# Patient Record
Sex: FEMALE | Race: WHITE | Employment: UNEMPLOYED | URBAN - METROPOLITAN AREA
[De-identification: names, ages, dates, MRNs, and addresses within clinical notes are randomized per-mention and may not be internally consistent; named-entity substitution may affect disease eponyms.]

---

## 2020-05-28 ENCOUNTER — TELEPHONE (OUTPATIENT)
Dept: FAMILY MEDICINE CLINIC | Age: 40
End: 2020-05-28

## 2020-05-28 NOTE — TELEPHONE ENCOUNTER
The following patient is requesting a new patient appointment    With Provider: Scarlet Jenkins     Insurance: [employer]    Medications / Conditions / Complaint : she is asking if she get IUD, Paragaurd. Or if she could be referred to a Gyn that does it.     Preferred Days: Monday is best, other days are ok    Preferred Time: n/a    Best Contact Number: [phone]

## 2020-06-01 ENCOUNTER — VIRTUAL VISIT (OUTPATIENT)
Dept: FAMILY MEDICINE CLINIC | Age: 40
End: 2020-06-01
Payer: COMMERCIAL

## 2020-06-01 PROCEDURE — 99442 PR PHYS/QHP TELEPHONE EVALUATION 11-20 MIN: CPT | Performed by: NURSE PRACTITIONER

## 2020-06-01 RX ORDER — VENLAFAXINE HYDROCHLORIDE 75 MG/1
75 CAPSULE, EXTENDED RELEASE ORAL DAILY
Qty: 30 CAPSULE | Refills: 3 | Status: SHIPPED | OUTPATIENT
Start: 2020-06-01 | End: 2020-08-03 | Stop reason: DRUGHIGH

## 2020-06-01 RX ORDER — LEVOTHYROXINE SODIUM 112 UG/1
112 TABLET ORAL
COMMUNITY
Start: 2020-05-11

## 2020-06-01 ASSESSMENT — PATIENT HEALTH QUESTIONNAIRE - PHQ9
1. LITTLE INTEREST OR PLEASURE IN DOING THINGS: 0
SUM OF ALL RESPONSES TO PHQ QUESTIONS 1-9: 0
2. FEELING DOWN, DEPRESSED OR HOPELESS: 0
SUM OF ALL RESPONSES TO PHQ9 QUESTIONS 1 & 2: 0
SUM OF ALL RESPONSES TO PHQ QUESTIONS 1-9: 0

## 2020-06-01 NOTE — PROGRESS NOTES
colon cancer in mother  Continue close follow up with GI for colonoscopy. Documentation:  I communicated with the patient and/or health care decision maker about establish care. Details of this discussion including any medical advice provided:       I affirm this is a Patient Initiated Episode with a Patient who has not had a related appointment within my department in the past 7 days or scheduled within the next 24 hours.     Patient identification was verified at the start of the visit: Yes    Total Time: minutes: 11-20 minutes    Note: not billable if this call serves to triage the patient into an appointment for the relevant concern      Florance Ridgel

## 2020-08-03 ENCOUNTER — TELEPHONE (OUTPATIENT)
Dept: FAMILY MEDICINE CLINIC | Age: 40
End: 2020-08-03

## 2020-08-03 RX ORDER — VENLAFAXINE HYDROCHLORIDE 37.5 MG/1
37.5 CAPSULE, EXTENDED RELEASE ORAL DAILY
Qty: 30 CAPSULE | Refills: 1 | Status: SHIPPED | OUTPATIENT
Start: 2020-08-03 | End: 2020-08-28 | Stop reason: DRUGHIGH

## 2020-08-03 NOTE — TELEPHONE ENCOUNTER
I would recommend first trying to lower the dosage of the effexor to 37.5. she would need to wean off the effexor first anyway so would be the first step. I will send in a prescription for the 37.5 and have her follow up with me in 1 month. We can discuss then if she is feeling better or if we needs to continue the wean to try something else.

## 2020-08-03 NOTE — TELEPHONE ENCOUNTER
Pt wants to try something else in place of the effexor. She feels it is too potent. She said she has been tired and more \"spacey\". She wants to see what Usha advises.

## 2020-08-27 ENCOUNTER — TELEPHONE (OUTPATIENT)
Dept: FAMILY MEDICINE CLINIC | Age: 40
End: 2020-08-27

## 2020-08-27 RX ORDER — VENLAFAXINE HYDROCHLORIDE 37.5 MG/1
37.5 CAPSULE, EXTENDED RELEASE ORAL DAILY
Qty: 30 CAPSULE | Refills: 1 | Status: CANCELLED | OUTPATIENT
Start: 2020-08-27

## 2020-08-27 NOTE — TELEPHONE ENCOUNTER
Pt states she has enough to last til tomorrow until I can send this to  [facility].

## 2020-08-27 NOTE — TELEPHONE ENCOUNTER
Pt wants to ask Usha if she can go back to original dose of effexor? Pt said she decreased to 37.5 mg but has had to start taking 2 tabs to increase again with everything going on. She states 1/2 dose was not enough. Pt said she has about 1 - 2 days left of the med. She also wants to see if she can  refill of this at [facility]. Please advise. Pt is aware that Sandie Rachel is not in today.       Requested Prescriptions     Pending Prescriptions Disp Refills    venlafaxine (EFFEXOR XR) 37.5 MG extended release capsule 30 capsule 1     Sig: Take 1 capsule by mouth daily   Yana 5999 Pradeep  Last ov 06/01/20  vv

## 2020-08-28 RX ORDER — VENLAFAXINE HYDROCHLORIDE 75 MG/1
75 CAPSULE, EXTENDED RELEASE ORAL DAILY
Qty: 30 CAPSULE | Refills: 5 | Status: SHIPPED | OUTPATIENT
Start: 2020-08-28

## 2020-09-10 ENCOUNTER — TELEPHONE (OUTPATIENT)
Dept: FAMILY MEDICINE CLINIC | Age: 40
End: 2020-09-10

## 2020-09-10 NOTE — TELEPHONE ENCOUNTER
If it has been going on for months and she states her sinuses and lungs are clear then it is likely allergy related. Continue flonase and add zytrec daily. If no improvement then she needs a virtual visit or red clinic visit.

## 2020-09-10 NOTE — TELEPHONE ENCOUNTER
Patient called and states that she has a mucous type cough. Patient states that she has this for months. Patient states that Mucinex helps some but she can't seem to get anything out when she coughs. When she does, it is yellowish-green. Patient states that her sinuses and lungs are clear. Please send rx to [id number] [facility] in [place].

## 2021-01-01 ENCOUNTER — HOSPITAL ENCOUNTER (EMERGENCY)
Age: 41
Discharge: HOME | End: 2021-01-01
Payer: COMMERCIAL

## 2021-01-01 VITALS
HEART RATE: 97 BPM | RESPIRATION RATE: 16 BRPM | WEIGHT: 115 LBS | SYSTOLIC BLOOD PRESSURE: 127 MMHG | OXYGEN SATURATION: 96 % | HEIGHT: 63 IN | BODY MASS INDEX: 20.38 KG/M2 | DIASTOLIC BLOOD PRESSURE: 88 MMHG | TEMPERATURE: 97.1 F

## 2021-01-01 DIAGNOSIS — S01.01XA LACERATION OF SCALP, INITIAL ENCOUNTER: Primary | ICD-10-CM

## 2021-01-01 PROCEDURE — 6360000002 HC RX W HCPCS: Performed by: NURSE PRACTITIONER

## 2021-01-01 PROCEDURE — 99282 EMERGENCY DEPT VISIT SF MDM: CPT

## 2021-01-01 PROCEDURE — 90471 IMMUNIZATION ADMIN: CPT | Performed by: NURSE PRACTITIONER

## 2021-01-01 PROCEDURE — 90715 TDAP VACCINE 7 YRS/> IM: CPT | Performed by: NURSE PRACTITIONER

## 2021-01-01 RX ADMIN — TETANUS TOXOID, REDUCED DIPHTHERIA TOXOID AND ACELLULAR PERTUSSIS VACCINE, ADSORBED 0.5 ML: 5; 2.5; 8; 8; 2.5 SUSPENSION INTRAMUSCULAR at 16:18

## 2021-01-01 ASSESSMENT — ENCOUNTER SYMPTOMS
COLOR CHANGE: 0
RHINORRHEA: 0
ABDOMINAL PAIN: 0
SHORTNESS OF BREATH: 0
SORE THROAT: 0

## 2021-01-01 ASSESSMENT — PAIN SCALES - GENERAL: PAINLEVEL_OUTOF10: 2

## 2021-01-01 NOTE — ED PROVIDER NOTES
Evaluated by [id number] [facility] [employer] Provider          [facility] [address]  Demetrio        Pt Name: Robert Vaughan  MRN: [id number]  [place] 1980  Dateof evaluation: 1/1/2021  Provider: MELINDA Lee CNP  PCP: MELINDA Nicholas CNP  ED Attending: No att. providers found    [address] [facility]       Chief Complaint   Patient presents with    Head Laceration     Pt to ED with reports of lac to L side of head after star shaped mirror fell on her. denies blood thinners or LOC>       HISTORY OF PRESENTILLNESS   (Location/Symptom, Timing/Onset, Context/Setting, Quality, Duration, Modifying Factors, Severity)  Note limiting factors. Robert Vaughan is a 38 y.o. female for laceration to her head. Onset was today. Context includes patient states that she had recently hung the mirror on the wall and it fell off and struck her to the left side of the head. Patient denies any loss of consciousness or use of blood thinners. Patient states that she is unaware of her tetanus status. . Alleviating factors include nothing. Aggravating factors include nothing. Pain is 2/10. Nothing has been used for pain today. Nursing Notes were all reviewed and agreed with or any disagreements were addressed  in the HPI. REVIEW OF SYSTEMS    (2-9 systems for level 4, 10 or more for level 5)     Review of Systems   Constitutional: Negative for fever. HENT: Negative for congestion, rhinorrhea and sore throat. Respiratory: Negative for shortness of breath. Cardiovascular: Negative for chest pain. Gastrointestinal: Negative for abdominal pain. Genitourinary: Negative for decreased urine volume and difficulty urinating. Musculoskeletal: Negative for arthralgias and myalgias. Skin: Positive for wound. Negative for color change and rash. Neurological: Negative for dizziness and light-headedness. Psychiatric/Behavioral: Negative for agitation.    All other systems reviewed and are negative. Positives and Pertinent negatives as per HPI. Except as noted above in the ROS, all other systems were reviewed and negative. 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 VENLAFAXINE (EFFEXOR XR) 75 MG EXTENDED RELEASE CAPSULE    Take 1 capsule by mouth daily         ALLERGIES     Patient has no known allergies.     FAMILY HISTORY       Family History   Problem Relation Age of Onset    Cancer Mother 45        colon    Cancer Maternal Grandmother 61        colon          SOCIAL HISTORY       Social History     Socioeconomic History    Marital status:      Spouse name: None    Number of children: None    Years of education: None    Highest education level: None   Occupational History    None   Social Needs    Financial resource strain: None    Food insecurity     Worry: None     Inability: None    Transportation needs     Medical: None     Non-medical: None   Tobacco Use    Smoking status: Never Smoker    Smokeless tobacco: Never Used   Substance and Sexual Activity    Alcohol use: Yes     Comment: socially     Drug use: Never    Sexual activity: Yes     Partners: Male   Lifestyle    Physical activity     Days per week: None     Minutes per session: None    Stress: None   Relationships    Social connections Talks on phone: None     Gets together: None     Attends Sabianism service: None     Active member of club or organization: None     Attends meetings of clubs or organizations: None     Relationship status: None    Intimate partner violence     Fear of current or ex partner: None     Emotionally abused: None     Physically abused: None     Forced sexual activity: None   Other Topics Concern    None   Social History Narrative    None       SCREENINGS             PHYSICAL EXAM  (up to 7 for level 4, 8 or more for level 5)     ED Triage Vitals [01/01/21 1446]   BP Temp Temp Source Pulse Resp SpO2 Height Weight   127/88 97.1 °F (36.2 °C) Oral 97 16 96 % 5' 3\" (1.6 m) 115 lb (52.2 kg)       Physical Exam  Constitutional:       Appearance: She is well-developed. HENT:      Head: Normocephalic. Neck:      Musculoskeletal: Normal range of motion. Cardiovascular:      Rate and Rhythm: Normal rate. Pulmonary:      Effort: Pulmonary effort is normal. No respiratory distress. Abdominal:      General: There is no distension. Palpations: Abdomen is soft. Musculoskeletal: Normal range of motion. Skin:     General: Skin is warm and dry. Neurological:      Mental Status: She is alert and oriented to person, place, and time. DIAGNOSTIC RESULTS   LABS:    Labs Reviewed - No data to display    All other labs werewithin normal range or not returned as of this dictation. EKG: All EKG's are interpreted by the Emergency Department Physician who either signs or Co-signs this chart in the absence of a cardiologist.  Please see their note for interpretation of EKG. RADIOLOGY:           Interpretation per the Radiologist below, if available at the time of this note:    No orders to display     No results found.       PROCEDURES   Unless otherwise noted below, none     Procedures     CRITICAL CARE TIME   N/A    CONSULTS:  None      EMERGENCYDEPARTMENT COURSE and DIFFERENTIAL DIAGNOSIS/MDM:   Vitals: portions of this note were completed with a voice recognition program.  Efforts were made to edit the dictations but occasionally words are mis-transcribed.)    MELINDA Mark CNP (electronically signed)         MELINDA Mark CNP  01/01/21 255 [address], APRN - CNP  01/01/21 1486